# Patient Record
Sex: FEMALE | Race: WHITE | NOT HISPANIC OR LATINO | URBAN - METROPOLITAN AREA
[De-identification: names, ages, dates, MRNs, and addresses within clinical notes are randomized per-mention and may not be internally consistent; named-entity substitution may affect disease eponyms.]

---

## 2018-08-13 ENCOUNTER — NEW PATIENT (OUTPATIENT)
Dept: URBAN - METROPOLITAN AREA CLINIC 26 | Facility: CLINIC | Age: 68
End: 2018-08-13
Payer: MEDICARE

## 2018-08-13 PROCEDURE — 92134 CPTRZ OPH DX IMG PST SGM RTA: CPT | Performed by: OPTOMETRIST

## 2018-08-13 PROCEDURE — 92004 COMPRE OPH EXAM NEW PT 1/>: CPT | Performed by: OPTOMETRIST

## 2018-08-13 RX ORDER — OLOPATADINE HYDROCHLORIDE 7 MG/ML
0.7 % SOLUTION OPHTHALMIC
Qty: 1 | Refills: 4 | Status: INACTIVE
Start: 2018-08-13 | End: 2019-11-20

## 2018-08-13 ASSESSMENT — INTRAOCULAR PRESSURE
OS: 15
OD: 16

## 2018-08-13 ASSESSMENT — KERATOMETRY
OD: 44.75
OS: 44.63

## 2018-08-13 ASSESSMENT — VISUAL ACUITY
OD: 20/20
OS: 20/20

## 2018-11-09 ENCOUNTER — Encounter (OUTPATIENT)
Dept: URBAN - METROPOLITAN AREA CLINIC 24 | Facility: CLINIC | Age: 68
End: 2018-11-09
Payer: MEDICARE

## 2018-11-09 DIAGNOSIS — Z01.818 ENCOUNTER FOR OTHER PREPROCEDURAL EXAMINATION: Primary | ICD-10-CM

## 2018-11-09 PROCEDURE — 92025 CPTRIZED CORNEAL TOPOGRAPHY: CPT | Performed by: OPHTHALMOLOGY

## 2018-11-09 PROCEDURE — 99213 OFFICE O/P EST LOW 20 MIN: CPT | Performed by: PHYSICIAN ASSISTANT

## 2018-11-13 ENCOUNTER — FOLLOW UP ESTABLISHED (OUTPATIENT)
Dept: URBAN - METROPOLITAN AREA CLINIC 26 | Facility: CLINIC | Age: 68
End: 2018-11-13
Payer: MEDICARE

## 2018-11-13 DIAGNOSIS — H25.813 COMBINED FORMS OF AGE-RELATED CATARACT, BILATERAL: Primary | ICD-10-CM

## 2018-11-13 DIAGNOSIS — H10.33 UNSPECIFIED ACUTE CONJUNCTIVITIS, BILATERAL: ICD-10-CM

## 2018-11-13 PROCEDURE — 76519 ECHO EXAM OF EYE: CPT | Performed by: OPHTHALMOLOGY

## 2018-11-13 PROCEDURE — 92012 INTRM OPH EXAM EST PATIENT: CPT | Performed by: OPHTHALMOLOGY

## 2018-11-13 ASSESSMENT — INTRAOCULAR PRESSURE
OS: 15
OD: 15

## 2018-11-20 ENCOUNTER — SURGERY (OUTPATIENT)
Dept: URBAN - METROPOLITAN AREA SURGERY 12 | Facility: SURGERY | Age: 68
End: 2018-11-20
Payer: MEDICARE

## 2018-11-20 PROCEDURE — 66984 XCAPSL CTRC RMVL W/O ECP: CPT | Performed by: OPHTHALMOLOGY

## 2018-11-21 ENCOUNTER — POST OP (OUTPATIENT)
Dept: URBAN - METROPOLITAN AREA CLINIC 26 | Facility: CLINIC | Age: 68
End: 2018-11-21

## 2018-11-21 PROCEDURE — 99024 POSTOP FOLLOW-UP VISIT: CPT | Performed by: OPTOMETRIST

## 2018-11-21 ASSESSMENT — INTRAOCULAR PRESSURE: OD: 15

## 2018-11-27 ENCOUNTER — POST OP (OUTPATIENT)
Dept: URBAN - METROPOLITAN AREA CLINIC 26 | Facility: CLINIC | Age: 68
End: 2018-11-27

## 2018-11-27 PROCEDURE — 99024 POSTOP FOLLOW-UP VISIT: CPT | Performed by: OPTOMETRIST

## 2018-11-27 ASSESSMENT — INTRAOCULAR PRESSURE
OS: 16
OD: 22

## 2018-11-27 ASSESSMENT — VISUAL ACUITY
OS: 20/20
OD: 20/20

## 2018-12-04 ENCOUNTER — SURGERY (OUTPATIENT)
Dept: URBAN - METROPOLITAN AREA SURGERY 12 | Facility: SURGERY | Age: 68
End: 2018-12-04
Payer: MEDICARE

## 2018-12-04 PROCEDURE — 66984 XCAPSL CTRC RMVL W/O ECP: CPT | Performed by: OPHTHALMOLOGY

## 2018-12-05 ENCOUNTER — POST OP (OUTPATIENT)
Dept: URBAN - METROPOLITAN AREA CLINIC 26 | Facility: CLINIC | Age: 68
End: 2018-12-05

## 2018-12-05 PROCEDURE — 99024 POSTOP FOLLOW-UP VISIT: CPT | Performed by: OPTOMETRIST

## 2018-12-05 ASSESSMENT — INTRAOCULAR PRESSURE: OS: 22

## 2018-12-27 ENCOUNTER — POST OP (OUTPATIENT)
Dept: URBAN - METROPOLITAN AREA CLINIC 26 | Facility: CLINIC | Age: 68
End: 2018-12-27

## 2018-12-27 PROCEDURE — 99024 POSTOP FOLLOW-UP VISIT: CPT | Performed by: OPTOMETRIST

## 2018-12-27 ASSESSMENT — VISUAL ACUITY
OD: 20/20
OS: 20/20

## 2018-12-27 ASSESSMENT — INTRAOCULAR PRESSURE
OD: 18
OS: 16

## 2019-11-20 ENCOUNTER — FOLLOW UP ESTABLISHED (OUTPATIENT)
Dept: URBAN - METROPOLITAN AREA CLINIC 26 | Facility: CLINIC | Age: 69
End: 2019-11-20
Payer: MEDICARE

## 2019-11-20 PROCEDURE — BRUDE BRUDER EYE MOIST COMPRESS: CUSTOM | Performed by: OPTOMETRIST

## 2019-11-20 PROCEDURE — 92014 COMPRE OPH EXAM EST PT 1/>: CPT | Performed by: OPTOMETRIST

## 2019-11-20 RX ORDER — CYCLOSPORINE 0.5 MG/ML
0.05 % EMULSION OPHTHALMIC
Qty: 60 | Refills: 11 | Status: INACTIVE
Start: 2019-11-20 | End: 2021-03-05

## 2019-11-20 ASSESSMENT — VISUAL ACUITY
OD: 20/20
OS: 20/20

## 2019-11-20 ASSESSMENT — INTRAOCULAR PRESSURE
OS: 15
OD: 16

## 2019-11-20 ASSESSMENT — KERATOMETRY
OS: 43.75
OD: 44.63

## 2020-02-10 ENCOUNTER — Encounter (OUTPATIENT)
Dept: URBAN - METROPOLITAN AREA CLINIC 26 | Facility: CLINIC | Age: 70
End: 2020-02-10
Payer: MEDICARE

## 2020-02-10 DIAGNOSIS — H26.493 OTHER SECONDARY CATARACT, BILATERAL: ICD-10-CM

## 2020-02-10 PROCEDURE — 99213 OFFICE O/P EST LOW 20 MIN: CPT | Performed by: PHYSICIAN ASSISTANT

## 2020-02-25 ENCOUNTER — SURGERY (OUTPATIENT)
Dept: URBAN - METROPOLITAN AREA SURGERY 12 | Facility: SURGERY | Age: 70
End: 2020-02-25
Payer: MEDICARE

## 2020-02-25 PROCEDURE — 66821 AFTER CATARACT LASER SURGERY: CPT | Performed by: OPHTHALMOLOGY

## 2020-03-09 ENCOUNTER — Encounter (OUTPATIENT)
Dept: URBAN - METROPOLITAN AREA CLINIC 26 | Facility: CLINIC | Age: 70
End: 2020-03-09
Payer: MEDICARE

## 2020-03-09 PROCEDURE — 99213 OFFICE O/P EST LOW 20 MIN: CPT | Performed by: PHYSICIAN ASSISTANT

## 2020-03-16 ENCOUNTER — SURGERY (OUTPATIENT)
Dept: URBAN - METROPOLITAN AREA SURGERY 12 | Facility: SURGERY | Age: 70
End: 2020-03-16
Payer: MEDICARE

## 2020-03-16 PROCEDURE — 66821 AFTER CATARACT LASER SURGERY: CPT | Performed by: OPHTHALMOLOGY

## 2020-10-05 ENCOUNTER — FOLLOW UP ESTABLISHED (OUTPATIENT)
Dept: URBAN - METROPOLITAN AREA CLINIC 26 | Facility: CLINIC | Age: 70
End: 2020-10-05
Payer: MEDICARE

## 2020-10-05 DIAGNOSIS — H18.59 OTHER HEREDITARY CORNEAL DYSTROPHIES: ICD-10-CM

## 2020-10-05 DIAGNOSIS — Z96.1 PRESENCE OF INTRAOCULAR LENS: ICD-10-CM

## 2020-10-05 DIAGNOSIS — H16.223 KERATOCONJUNCT SICCA, NOT SPECIFIED AS SJOGREN'S, BILATERAL: Primary | ICD-10-CM

## 2020-10-05 PROCEDURE — 92014 COMPRE OPH EXAM EST PT 1/>: CPT | Performed by: OPTOMETRIST

## 2020-10-05 ASSESSMENT — KERATOMETRY
OS: 44.25
OD: 44.63

## 2020-10-05 ASSESSMENT — INTRAOCULAR PRESSURE
OD: 14
OS: 15

## 2020-10-05 ASSESSMENT — VISUAL ACUITY
OD: 20/20
OS: 20/20

## 2021-10-05 ENCOUNTER — OFFICE VISIT (OUTPATIENT)
Dept: URBAN - METROPOLITAN AREA CLINIC 26 | Facility: CLINIC | Age: 71
End: 2021-10-05
Payer: OTHER MISCELLANEOUS

## 2021-10-05 DIAGNOSIS — H52.4 PRESBYOPIA: ICD-10-CM

## 2021-10-05 DIAGNOSIS — H18.593 OTHER HEREDITARY CORNEAL DYSTROPHIES: ICD-10-CM

## 2021-10-05 DIAGNOSIS — H04.123 TEAR FILM INSUFFICIENCY OF BILATERAL LACRIMAL GLANDS: Primary | ICD-10-CM

## 2021-10-05 DIAGNOSIS — H43.392 OTHER VITREOUS OPACITIES, LEFT EYE: ICD-10-CM

## 2021-10-05 DIAGNOSIS — H43.811 VITREOUS DEGENERATION, RIGHT EYE: ICD-10-CM

## 2021-10-05 PROCEDURE — 92014 COMPRE OPH EXAM EST PT 1/>: CPT | Performed by: OPTOMETRIST

## 2021-10-05 PROCEDURE — 92134 CPTRZ OPH DX IMG PST SGM RTA: CPT | Performed by: OPTOMETRIST

## 2021-10-05 ASSESSMENT — INTRAOCULAR PRESSURE
OS: 16
OD: 15

## 2021-10-05 ASSESSMENT — KERATOMETRY
OD: 44.50
OS: 43.75

## 2021-10-05 ASSESSMENT — VISUAL ACUITY
OS: 20/20
OD: 20/20

## 2021-10-05 NOTE — IMPRESSION/PLAN
Impression: Vitreous degeneration, right eye: H43.811. Plan: No retinal pathology. No family history, prior peripheral retinal disease, or other risk factors evident. Warning signs of retinal tear and detachment discussed with patient. To return to clinic STAT if any change in symptoms consistent with RT or RD.

## 2021-10-05 NOTE — IMPRESSION/PLAN
Impression: Tear film insufficiency of bilateral lacrimal glands: H04.123. Plan: Recommend artificial tears at least 4 times a day and gel drop or tear ointment at bedtime, Omega 3 fatty acids (2-3,000 mg) daily. avoid overhead fans at bedtime.